# Patient Record
Sex: FEMALE | Race: WHITE | NOT HISPANIC OR LATINO | Employment: FULL TIME | URBAN - METROPOLITAN AREA
[De-identification: names, ages, dates, MRNs, and addresses within clinical notes are randomized per-mention and may not be internally consistent; named-entity substitution may affect disease eponyms.]

---

## 2023-11-06 ENCOUNTER — HOSPITAL ENCOUNTER (EMERGENCY)
Facility: HOSPITAL | Age: 33
Discharge: HOME | End: 2023-11-06
Attending: EMERGENCY MEDICINE | Admitting: EMERGENCY MEDICINE
Payer: COMMERCIAL

## 2023-11-06 VITALS
OXYGEN SATURATION: 98 % | HEIGHT: 61 IN | TEMPERATURE: 98.3 F | HEART RATE: 85 BPM | RESPIRATION RATE: 18 BRPM | SYSTOLIC BLOOD PRESSURE: 133 MMHG | WEIGHT: 126 LBS | DIASTOLIC BLOOD PRESSURE: 78 MMHG | BODY MASS INDEX: 23.79 KG/M2

## 2023-11-06 DIAGNOSIS — J03.90 EXUDATIVE TONSILLITIS: Primary | ICD-10-CM

## 2023-11-06 PROCEDURE — 99203 OFFICE O/P NEW LOW 30 MIN: CPT | Performed by: EMERGENCY MEDICINE

## 2023-11-06 PROCEDURE — 25010000002 DEXAMETHASONE PER 1 MG: Performed by: EMERGENCY MEDICINE

## 2023-11-06 PROCEDURE — 99283 EMERGENCY DEPT VISIT LOW MDM: CPT

## 2023-11-06 RX ORDER — AMOXICILLIN 500 MG/1
500 CAPSULE ORAL 3 TIMES DAILY
Qty: 21 CAPSULE | Refills: 0 | Status: SHIPPED | OUTPATIENT
Start: 2023-11-06 | End: 2023-11-13

## 2023-11-06 RX ADMIN — DEXAMETHASONE SODIUM PHOSPHATE 10 MG: 10 INJECTION, SOLUTION INTRAMUSCULAR; INTRAVENOUS at 07:27

## 2023-11-06 NOTE — DISCHARGE INSTRUCTIONS
Take antibiotics as prescribed for your condition.  Over-the-counter Tylenol, 1000 mg every 8 hours as needed for fever.  Warm salt water gargles are also helpful for your condition.  No ibuprofen or naproxen during pregnancy.  Return to ER for any emergent concerns.  Please follow-up with your primary care physician in 1 week.

## 2023-11-06 NOTE — Clinical Note
[facility] [place] [id number] [place] [address] [place] [address]  Phone: [phone]    Yenifer Leslie was seen and treated in our emergency department on 11/6/2023.  She may return to work on 11/09/2023.         Thank you for choosing [facility].    Alba Cordoba RN

## 2023-11-06 NOTE — FSED PROVIDER NOTE
Subjective   History of Present Illness  The patient is a 33-year-old female who has 7 weeks pregnant, who presents emergency room with complaints of tonsillar pain.  Patient states that she has had a sore throat for the past 3 days.  Patient states that 2 days ago she went to the [facility].  She states that she was tested for strep throat, mono, influenza and COVID.  She reports that all of her tests were negative and they wrote her 2 days of amoxicillin?  Patient states that she is not any better.  She reports sore throat.  She has a swollen lymph node on the left side of her neck.  She has had intermittent fevers.  She states that she is taking Tylenol, but it does not really bring down her fever.        Review of Systems   Constitutional:  Positive for fever. Negative for chills and fatigue.   HENT:  Positive for sore throat. Negative for drooling, ear discharge and ear pain.    Eyes: Negative.    Respiratory:  Negative for cough, chest tightness and shortness of breath.    Cardiovascular:  Negative for chest pain and palpitations.   Gastrointestinal:  Negative for abdominal pain, diarrhea, nausea and vomiting.   Genitourinary: Negative.    Musculoskeletal: Negative.    Skin: Negative.  Negative for rash.   Neurological:  Positive for headaches. Negative for syncope, weakness and numbness.   Psychiatric/Behavioral: Negative.     All other systems reviewed and are negative.      No past medical history on file.    Not on File    No past surgical history on file.    No family history on file.    Social History     Socioeconomic History    Marital status:            Objective   Physical Exam  Vitals and nursing note reviewed.   Constitutional:       General: She is not in acute distress.     Appearance: Normal appearance. She is normal weight.   HENT:      Right Ear: External ear normal.      Left Ear: External ear normal.      Nose: Nose normal.      Mouth/Throat:      Pharynx: Pharyngeal swelling,  oropharyngeal exudate and posterior oropharyngeal erythema present.   Cardiovascular:      Rate and Rhythm: Normal rate.   Pulmonary:      Effort: Pulmonary effort is normal.   Musculoskeletal:         General: Normal range of motion.      Cervical back: Normal range of motion.   Skin:     Capillary Refill: Capillary refill takes less than 2 seconds.   Neurological:      General: No focal deficit present.      Mental Status: She is alert and oriented to person, place, and time.   Psychiatric:         Mood and Affect: Mood normal.         Procedures           ED Course                                           Medical Decision Making  The patient presents to the emergency room with an emergent medical condition.  Patient is here for sore throat.  See HPI above for associated complaints.  Given the patient's presentation, and her pregnancy status, we will just go ahead and treat her.  She was already tested for COVID, influenza, strep, and mono-all of which were negative.  Differential diagnosis includes viral pharyngitis, bacterial pharyngitis, tonsillitis, peritonsillar abscess, retropharyngeal abscess, and/or uvulitis.      Treatment will include dexamethasone and amoxicillin.      Problems Addressed:  Exudative tonsillitis: complicated acute illness or injury    Risk  Prescription drug management.        Final diagnoses:   Exudative tonsillitis       ED Disposition  ED Disposition       ED Disposition   Discharge    Condition   Stable    Comment   --               Your PCP    Schedule an appointment as soon as possible for a visit in 3 days  For repeat evaluation         Medication List        New Prescriptions      amoxicillin 500 MG capsule  Commonly known as: AMOXIL  Take 1 capsule by mouth 3 (Three) Times a Day for 7 days.               Where to Get Your Medications        These medications were sent to [id number] - [place], [place] - [address] AT [place] [address] & [facility] -  [phone] [facility] [phone] [address]  [address], [place] [address]      Phone: [phone]   amoxicillin 500 MG capsule